# Patient Record
Sex: MALE | Race: WHITE | Employment: FULL TIME | URBAN - METROPOLITAN AREA
[De-identification: names, ages, dates, MRNs, and addresses within clinical notes are randomized per-mention and may not be internally consistent; named-entity substitution may affect disease eponyms.]

---

## 2024-03-17 ENCOUNTER — HOSPITAL ENCOUNTER (OUTPATIENT)
Age: 54
Discharge: HOME | End: 2024-03-17
Payer: COMMERCIAL

## 2024-03-17 VITALS
RESPIRATION RATE: 20 BRPM | SYSTOLIC BLOOD PRESSURE: 171 MMHG | TEMPERATURE: 98 F | DIASTOLIC BLOOD PRESSURE: 94 MMHG | HEART RATE: 99 BPM | OXYGEN SATURATION: 97 %

## 2024-03-17 DIAGNOSIS — J34.89 RHINORRHEA: Primary | ICD-10-CM

## 2024-03-17 DIAGNOSIS — J02.9 VIRAL PHARYNGITIS: ICD-10-CM

## 2024-03-17 LAB
POCT INFLUENZA A: NEGATIVE
POCT INFLUENZA B: NEGATIVE
S PYO AG THROAT QL: NEGATIVE
SARS-COV-2 RNA RESP QL NAA+PROBE: NOT DETECTED

## 2024-03-17 RX ORDER — OXYMETAZOLINE HYDROCHLORIDE 0.05 G/100ML
1 SPRAY NASAL 2 TIMES DAILY
Qty: 15 ML | Refills: 0 | Status: SHIPPED | OUTPATIENT
Start: 2024-03-17 | End: 2024-04-16

## 2024-03-17 RX ORDER — OXYMETAZOLINE HYDROCHLORIDE 0.05 G/100ML
1 SPRAY NASAL EVERY 4 HOURS PRN
Qty: 15 ML | Refills: 0 | Status: SHIPPED | OUTPATIENT
Start: 2024-03-17 | End: 2024-03-17

## 2024-03-17 RX ORDER — AMLODIPINE BESYLATE 10 MG/1
10 TABLET ORAL DAILY
COMMUNITY

## 2024-03-17 RX ORDER — BENZONATATE 100 MG/1
100 CAPSULE ORAL 3 TIMES DAILY PRN
Qty: 30 CAPSULE | Refills: 0 | Status: SHIPPED | OUTPATIENT
Start: 2024-03-17 | End: 2024-04-16

## 2024-03-17 RX ORDER — PSEUDOEPHEDRINE HCL 30 MG
30 TABLET ORAL EVERY 4 HOURS PRN
Qty: 36 TABLET | Refills: 0 | Status: SHIPPED | OUTPATIENT
Start: 2024-03-17 | End: 2024-04-16

## 2024-03-17 RX ORDER — ALBUTEROL SULFATE 90 UG/1
2 AEROSOL, METERED RESPIRATORY (INHALATION) EVERY 4 HOURS PRN
Qty: 1 EACH | Refills: 0 | Status: SHIPPED | OUTPATIENT
Start: 2024-03-17 | End: 2024-04-16

## 2024-03-17 NOTE — ED PROVIDER NOTES
Patient Seen in: [facility]      History     Chief Complaint   Patient presents with    Sore Throat     Entered by patient    Sore Throat    Sneezing    Runny Nose     Stated Complaint: Sore Throat    Subjective:   HPI  Jonathan Khoury is a 53 year old male  presents with throat pain x 2 days. Patient reports dysphagia to both solids and liquids, ear pain, rhinorrhea, fevers, chills. Patient denies shortness of breath, respiratory distress, stridor, neck pain/ stiffness, headache, eye pain/ redness, facial/ lip/ eyelid swelling. No medications taken prior to arrival. No alleviating/ aggravating factors. Pain 3/10      Objective:   Past Medical History:   Diagnosis Date    Allergic rhinitis     Seasonal    Essential hypertension               Past Surgical History:   Procedure Laterality Date    OTHER SURGICAL HISTORY  2007    Deviated septum repair                Social History     Socioeconomic History    Marital status:    Tobacco Use    Smoking status: Never    Smokeless tobacco: Never   Vaping Use    Vaping Use: Never used   Substance and Sexual Activity    Alcohol use: Yes     Comment: 1/month    Drug use: Never              Review of Systems   All other systems reviewed and are negative.      Positive for stated complaint: Sore Throat  Other systems are as noted in HPI.  Constitutional and vital signs reviewed.      All other systems reviewed and negative except as noted above.    Physical Exam     ED Triage Vitals [03/17/24 1211]   BP (!) 171/94   Pulse 99   Resp 20   Temp 98.4 °F (36.9 °C)   Temp src Temporal   SpO2 97 %   O2 Device None (Room air)       Current:BP (!) 171/94   Pulse 99   Temp 98.4 °F (36.9 °C) (Temporal)   Resp 20   SpO2 97%         Physical Exam  Vitals and nursing note reviewed.   Constitutional:       General: He is not in acute distress.     Appearance: Normal appearance. He is normal weight. He is not ill-appearing, toxic-appearing or diaphoretic.    HENT:      Head: Normocephalic and atraumatic.      Right Ear: Tympanic membrane, ear canal and external ear normal. There is no impacted cerumen.      Left Ear: Tympanic membrane, ear canal and external ear normal. There is no impacted cerumen.      Nose: Congestion present. No rhinorrhea.      Mouth/Throat:      Mouth: Mucous membranes are moist.      Pharynx: Oropharynx is clear. No oropharyngeal exudate or posterior oropharyngeal erythema.   Eyes:      General: No scleral icterus.        Right eye: No discharge.         Left eye: No discharge.      Extraocular Movements: Extraocular movements intact.      Conjunctiva/sclera: Conjunctivae normal.      Pupils: Pupils are equal, round, and reactive to light.   Cardiovascular:      Rate and Rhythm: Normal rate.      Pulses: Normal pulses.   Pulmonary:      Effort: Pulmonary effort is normal. No respiratory distress.      Breath sounds: Normal breath sounds. No stridor. No wheezing, rhonchi or rales.   Chest:      Chest wall: No tenderness.   Musculoskeletal:         General: No swelling, tenderness, deformity or signs of injury. Normal range of motion.      Cervical back: Normal range of motion and neck supple.      Right lower leg: No edema.      Left lower leg: No edema.   Skin:     General: Skin is warm and dry.      Capillary Refill: Capillary refill takes less than 2 seconds.      Coloration: Skin is not jaundiced or pale.      Findings: No bruising, erythema, lesion or rash.   Neurological:      General: No focal deficit present.      Mental Status: He is alert and oriented to person, place, and time. Mental status is at baseline.   Psychiatric:         Mood and Affect: Mood normal.         Behavior: Behavior normal.         Thought Content: Thought content normal.         Judgment: Judgment normal.               ED Course     Labs Reviewed   POCT RAPID STREP - Normal   RAPID SARS-COV-2 BY PCR - Normal   POCT FLU TEST - Normal    Narrative:     This assay is  a rapid molecular in vitro test utilizing nucleic acid amplification of influenza A and B viral RNA.          ED Course as of 03/17/24 1314  ------------------------------------------------------------  Time: 03/17 1233  Value: Rapid SARS-CoV-2 by PCR  Comment: Negative    ------------------------------------------------------------  Time: 03/17 1236  Value: POCT Flu Test  Comment: Negative flu A and B               MDM                                        Medical Decision Making  53 year old male presents with viral pharyngitis. Considerations to include but not limited to viral pharyngitis vs peritonsillar abscess vs bronchitis vs pneumonia vs COVID 19 vs influenza A vs influenza B.  Patient is overall well-appearing, normotensive, nontachycardic, not dyspneic with oxygen saturation at 97% on room air  Plan  - COVID 19/ Flu  A and B/ strep  swab  - SpO2 97% on room air  - reassess  - DC to home   - rx: albuterol inhaler 1-2 puffs by mouth every 4-6 hours/ prn.  Afrin 2 sprays to bilateral nostrils BID for no more than 48 consecutive hours to avoid rebound congestion.  Sudafed 30mg po q 6 hours.   Tessalon 100mg PO TID.    - refer to PCP for further evaluation    - return to ED        Amount and/or Complexity of Data Reviewed  Labs: ordered. Decision-making details documented in ED Course.     Details: COVID 19/ Flu  A and B/ strep  swab- negative         Disposition and Plan     Clinical Impression:  1. Rhinorrhea    2. Viral pharyngitis         Disposition:  Discharge  3/17/2024  1:08 pm    Follow-up:  Keo De La Cruz MD  [address] [place] [address] [phone]          [address] [address] [phone]              Medications Prescribed:  Current Discharge Medication List        START taking these medications    Details   benzonatate 100 MG Oral Cap Take 1 capsule (100 mg total) by mouth 3 (three) times daily as needed for cough.  Qty: 30  capsule, Refills: 0      pseudoephedrine 30 MG Oral Tab Take 1 tablet (30 mg total) by mouth every 4 (four) hours as needed for congestion.  Qty: 36 tablet, Refills: 0    Associated Diagnoses: Viral pharyngitis      !! albuterol 108 (90 Base) MCG/ACT Inhalation Aero Soln Inhale 2 puffs into the lungs every 4 (four) hours as needed for Wheezing.  Qty: 1 each, Refills: 0      oxymetazoline 0.05 % Nasal Solution 1 spray by Nasal route 2 (two) times daily.  Qty: 15 mL, Refills: 0       !! - Potential duplicate medications found. Please discuss with provider.

## 2024-03-17 NOTE — ED INITIAL ASSESSMENT (HPI)
Pt c/o sore throat, sneezing and runny nose. Pt states symptoms started on Friday. Pt states he had 2 negative home covid tests, pt worried the test may have been .

## 2024-04-19 ENCOUNTER — OFFICE VISIT (OUTPATIENT)
Dept: INTERNAL MEDICINE CLINIC | Facility: CLINIC | Age: 54
End: 2024-04-19
Payer: COMMERCIAL

## 2024-04-19 ENCOUNTER — LAB ENCOUNTER (OUTPATIENT)
Dept: LAB | Age: 54
End: 2024-04-19
Attending: STUDENT IN AN ORGANIZED HEALTH CARE EDUCATION/TRAINING PROGRAM
Payer: COMMERCIAL

## 2024-04-19 VITALS
HEART RATE: 80 BPM | DIASTOLIC BLOOD PRESSURE: 90 MMHG | RESPIRATION RATE: 20 BRPM | WEIGHT: 207 LBS | HEIGHT: 69 IN | SYSTOLIC BLOOD PRESSURE: 160 MMHG | TEMPERATURE: 98 F | OXYGEN SATURATION: 99 % | BODY MASS INDEX: 30.66 KG/M2

## 2024-04-19 DIAGNOSIS — Z11.4 SCREENING FOR HIV WITHOUT PRESENCE OF RISK FACTORS: ICD-10-CM

## 2024-04-19 DIAGNOSIS — Z00.00 PHYSICAL EXAM, ANNUAL: Primary | ICD-10-CM

## 2024-04-19 DIAGNOSIS — Z13.0 SCREENING FOR DEFICIENCY ANEMIA: ICD-10-CM

## 2024-04-19 DIAGNOSIS — Z13.228 SCREENING FOR ENDOCRINE, METABOLIC AND IMMUNITY DISORDER: ICD-10-CM

## 2024-04-19 DIAGNOSIS — Z13.1 SCREENING FOR DIABETES MELLITUS: ICD-10-CM

## 2024-04-19 DIAGNOSIS — Z13.220 SCREENING FOR LIPID DISORDERS: ICD-10-CM

## 2024-04-19 DIAGNOSIS — Z13.29 SCREENING FOR THYROID DISORDER: ICD-10-CM

## 2024-04-19 DIAGNOSIS — Z11.59 NEED FOR HEPATITIS C SCREENING TEST: ICD-10-CM

## 2024-04-19 DIAGNOSIS — Z11.59 NEED FOR HEPATITIS B SCREENING TEST: ICD-10-CM

## 2024-04-19 DIAGNOSIS — Z13.0 SCREENING FOR ENDOCRINE, METABOLIC AND IMMUNITY DISORDER: ICD-10-CM

## 2024-04-19 DIAGNOSIS — Z12.11 SCREENING FOR COLON CANCER: ICD-10-CM

## 2024-04-19 DIAGNOSIS — Z13.29 SCREENING FOR ENDOCRINE, METABOLIC AND IMMUNITY DISORDER: ICD-10-CM

## 2024-04-19 DIAGNOSIS — I10 PRIMARY HYPERTENSION: ICD-10-CM

## 2024-04-19 DIAGNOSIS — E55.9 VITAMIN D DEFICIENCY: ICD-10-CM

## 2024-04-19 DIAGNOSIS — L91.8 SKIN TAG: ICD-10-CM

## 2024-04-19 PROBLEM — R03.0 ELEVATED BLOOD PRESSURE READING: Status: ACTIVE | Noted: 2017-01-05

## 2024-04-19 PROBLEM — J01.10 ACUTE NON-RECURRENT FRONTAL SINUSITIS: Status: ACTIVE | Noted: 2017-01-05

## 2024-04-19 LAB
ALBUMIN SERPL-MCNC: 4.3 G/DL (ref 3.4–5)
ALBUMIN/GLOB SERPL: 1.1 {RATIO} (ref 1–2)
ALP LIVER SERPL-CCNC: 93 U/L
ALT SERPL-CCNC: 57 U/L
ANION GAP SERPL CALC-SCNC: 6 MMOL/L (ref 0–18)
AST SERPL-CCNC: 33 U/L (ref 15–37)
BASOPHILS # BLD AUTO: 0.05 X10(3) UL (ref 0–0.2)
BASOPHILS NFR BLD AUTO: 0.7 %
BILIRUB SERPL-MCNC: 0.6 MG/DL (ref 0.1–2)
BUN BLD-MCNC: 13 MG/DL (ref 9–23)
CALCIUM BLD-MCNC: 9 MG/DL (ref 8.5–10.1)
CHLORIDE SERPL-SCNC: 105 MMOL/L (ref 98–112)
CHOLEST SERPL-MCNC: 179 MG/DL (ref ?–200)
CO2 SERPL-SCNC: 30 MMOL/L (ref 21–32)
CREAT BLD-MCNC: 1.22 MG/DL
EGFRCR SERPLBLD CKD-EPI 2021: 71 ML/MIN/1.73M2 (ref 60–?)
EOSINOPHIL # BLD AUTO: 0.21 X10(3) UL (ref 0–0.7)
EOSINOPHIL NFR BLD AUTO: 2.9 %
ERYTHROCYTE [DISTWIDTH] IN BLOOD BY AUTOMATED COUNT: 12.5 %
EST. AVERAGE GLUCOSE BLD GHB EST-MCNC: 126 MG/DL (ref 68–126)
FASTING PATIENT LIPID ANSWER: YES
FASTING STATUS PATIENT QL REPORTED: YES
GLOBULIN PLAS-MCNC: 3.9 G/DL (ref 2.8–4.4)
GLUCOSE BLD-MCNC: 105 MG/DL (ref 70–99)
HBA1C MFR BLD: 6 % (ref ?–5.7)
HBV CORE AB SERPL QL IA: NONREACTIVE
HBV SURFACE AB SER QL: NONREACTIVE
HBV SURFACE AB SERPL IA-ACNC: <3.1 MIU/ML
HBV SURFACE AG SER-ACNC: <0.1 [IU]/L
HBV SURFACE AG SERPL QL IA: NONREACTIVE
HCT VFR BLD AUTO: 45.9 %
HCV AB SERPL QL IA: NONREACTIVE
HDLC SERPL-MCNC: 43 MG/DL (ref 40–59)
HGB BLD-MCNC: 15.9 G/DL
IMM GRANULOCYTES # BLD AUTO: 0.02 X10(3) UL (ref 0–1)
IMM GRANULOCYTES NFR BLD: 0.3 %
LDLC SERPL CALC-MCNC: 119 MG/DL (ref ?–100)
LYMPHOCYTES # BLD AUTO: 2.11 X10(3) UL (ref 1–4)
LYMPHOCYTES NFR BLD AUTO: 29.5 %
MCH RBC QN AUTO: 29.9 PG (ref 26–34)
MCHC RBC AUTO-ENTMCNC: 34.6 G/DL (ref 31–37)
MCV RBC AUTO: 86.4 FL
MONOCYTES # BLD AUTO: 0.6 X10(3) UL (ref 0.1–1)
MONOCYTES NFR BLD AUTO: 8.4 %
NEUTROPHILS # BLD AUTO: 4.17 X10 (3) UL (ref 1.5–7.7)
NEUTROPHILS # BLD AUTO: 4.17 X10(3) UL (ref 1.5–7.7)
NEUTROPHILS NFR BLD AUTO: 58.2 %
NONHDLC SERPL-MCNC: 136 MG/DL (ref ?–130)
OSMOLALITY SERPL CALC.SUM OF ELEC: 292 MOSM/KG (ref 275–295)
PLATELET # BLD AUTO: 347 10(3)UL (ref 150–450)
POTASSIUM SERPL-SCNC: 3 MMOL/L (ref 3.5–5.1)
PROT SERPL-MCNC: 8.2 G/DL (ref 6.4–8.2)
RBC # BLD AUTO: 5.31 X10(6)UL
SODIUM SERPL-SCNC: 141 MMOL/L (ref 136–145)
TRIGL SERPL-MCNC: 93 MG/DL (ref 30–149)
TSI SER-ACNC: 1.67 MIU/ML (ref 0.36–3.74)
VIT D+METAB SERPL-MCNC: 40.1 NG/ML (ref 30–100)
VLDLC SERPL CALC-MCNC: 16 MG/DL (ref 0–30)
WBC # BLD AUTO: 7.2 X10(3) UL (ref 4–11)

## 2024-04-19 PROCEDURE — 82306 VITAMIN D 25 HYDROXY: CPT

## 2024-04-19 PROCEDURE — 87340 HEPATITIS B SURFACE AG IA: CPT

## 2024-04-19 PROCEDURE — 86706 HEP B SURFACE ANTIBODY: CPT

## 2024-04-19 PROCEDURE — 84443 ASSAY THYROID STIM HORMONE: CPT

## 2024-04-19 PROCEDURE — 86704 HEP B CORE ANTIBODY TOTAL: CPT

## 2024-04-19 PROCEDURE — 86803 HEPATITIS C AB TEST: CPT

## 2024-04-19 PROCEDURE — 80053 COMPREHEN METABOLIC PANEL: CPT

## 2024-04-19 PROCEDURE — 80061 LIPID PANEL: CPT

## 2024-04-19 PROCEDURE — 85025 COMPLETE CBC W/AUTO DIFF WBC: CPT

## 2024-04-19 PROCEDURE — 83036 HEMOGLOBIN GLYCOSYLATED A1C: CPT

## 2024-04-19 PROCEDURE — 87389 HIV-1 AG W/HIV-1&-2 AB AG IA: CPT

## 2024-04-19 RX ORDER — AMLODIPINE BESYLATE 5 MG/1
10 TABLET ORAL DAILY
COMMUNITY
End: 2024-04-19

## 2024-04-19 RX ORDER — LOSARTAN POTASSIUM 25 MG/1
25 TABLET ORAL DAILY
Qty: 30 TABLET | Refills: 0 | Status: SHIPPED | OUTPATIENT
Start: 2024-04-19 | End: 2024-04-22

## 2024-04-19 RX ORDER — AMLODIPINE BESYLATE 5 MG/1
10 TABLET ORAL DAILY
Qty: 90 TABLET | Refills: 0 | Status: CANCELLED | OUTPATIENT
Start: 2024-04-19

## 2024-04-19 RX ORDER — AMLODIPINE BESYLATE 10 MG/1
10 TABLET ORAL DAILY
Qty: 90 TABLET | Refills: 0 | Status: SHIPPED | OUTPATIENT
Start: 2024-04-19

## 2024-04-19 NOTE — PROGRESS NOTES
[facility]    CHIEF COMPLAINT:   Chief Complaint   Patient presents with    [facility]     New patient     Routine Physical     Colon N/a Last Psa 6/4/21          HPI:   Jonathan Khoury is a 53 year old male who presents for a complete physical exam.      Patient Active Problem List   Diagnosis    Allergic rhinitis    HTN (hypertension)    Elevated blood pressure reading    Acute non-recurrent frontal sinusitis      Born and raised in [place],   [employer] 40 hour week Works from home   for 30, no children    Started on Amlodipine 10 mg daily in October 2023    Diet: Regular, can eat fast food 2 times a week.  Exercise:  Trying to get back to exercising. Has stationary bike at home. [place]  Eye exam: Goes to eye doctor for corneal abraision, much better now , has reading glasses , no contacts   Dentist: every 3-4  months for cleanings and checkups   Driving: yes, with the seatbelt  Sunscreen: yes, everyday throughout the year   Smoking: Never  Alcohol: On holidays  No other substance use.     Vaccinations:  Influenza: Out of season  COVID: Vaccinated x 5  Pneumococcal: Does not want It at this time  Shingles: Due, will provide information  Tdap/Td: 2019    Screenings:  Colonoscopy screen:    PSA: Due (will order next visit)    Diabetes screen? (age 35 to 70 who are overweight or obese): A1c ordered  Lipid panel? (age 20-35 with increased risk of CHD or older than 35): ordered    Wt Readings from Last 6 Encounters:   04/19/24 207 lb (93.9 kg)   06/04/21 212 lb 3.2 oz (96.3 kg)   12/20/19 205 lb (93 kg)     Body mass index is 30.57 kg/m².       Current Outpatient Medications   Medication Sig Dispense Refill    losartan 25 MG Oral Tab Take 1 tablet (25 mg total) by mouth daily. 90 tablet 0    amLODIPine 10 MG Oral Tab Take 1 tablet (10 mg total) by mouth daily. 90 tablet 0    Montelukast Sodium 10 MG Oral Tab Take 1 tablet (10 mg total) by mouth nightly. 90 tablet 3    Albuterol  Sulfate  (90 Base) MCG/ACT Inhalation Aero Soln Inhale 2 puffs into the lungs every 6 (six) hours as needed for Wheezing. 1 each 2      Past Medical History:    Allergic rhinitis    Seasonal    Essential hypertension      Past Surgical History:   Procedure Laterality Date    Other surgical history  2007    Deviated septum repair      Family History   Problem Relation Age of Onset    Hypertension Father     Cancer Father         Skin CA - wtih anti-rejection drugs    Other (Kidney replacement) Father     Hypertension Mother     Psychiatric Sister       Social History:   Social History     Socioeconomic History    Marital status:    Tobacco Use    Smoking status: Never     Passive exposure: Never    Smokeless tobacco: Never   Vaping Use    Vaping status: Never Used   Substance and Sexual Activity    Alcohol use: Yes     Comment: 1/month    Drug use: Never          REVIEW OF SYSTEMS:   GENERAL: feels well otherwise  SKIN: Positive for skin tags  EYES:denies blurred vision or double vision  HEENT: denies nasal congestion, sinus pain or ST  LUNGS: denies shortness of breath with exertion  CARDIOVASCULAR: denies chest pain on exertion  GI: denies abdominal pain,denies heartburn  : denies dysuria  MUSCULOSKELETAL: denies back pain  NEURO: denies headaches  PSYCHE: denies depression or anxiety  HEMATOLOGIC: denies hx of anemia  ENDOCRINE: denies thyroid history  ALL/ASTHMA: Positive  hx of asthma and pollen allergy  EXAM:   /90   Pulse 80   Temp 98.4 °F (36.9 °C)   Resp 20   Ht 5' 9\" (1.753 m)   Wt 207 lb (93.9 kg)   SpO2 99%   BMI 30.57 kg/m²   Body mass index is 30.57 kg/m².   GENERAL: well developed, well nourished,in no apparent distress  SKIN: no rashes,no suspicious lesions  HEENT: atraumatic, normocephalic,ears and throat are clear  EYES:PERRLA, conjunctiva are clear  NECK: supple,no adenopathy  CHEST: no chest tenderness  LUNGS: clear to auscultation  CARDIO: nl s1 and s2, RRR without  murmur  GI: good BS's,no masses, HSM or tenderness  MUSCULOSKELETAL: back is not tender,FROM of the back  EXTREMITIES: no cyanosis, clubbing or edema  NEURO: Oriented times three,cranial nerves are intact,motor and sensory are grossly intact  Labs:   No results found for: \"WBC\", \"HGB\", \"PLT\"   Lab Results   Component Value Date/Time     06/04/2021 08:37 AM     06/04/2021 08:37 AM    K 4.21 06/04/2021 08:37 AM     06/04/2021 08:37 AM    CO2 26.4 06/04/2021 08:37 AM    CREATSERUM 1.07 06/04/2021 08:37 AM    CA 9.4 06/04/2021 08:37 AM    ALB 4.8 06/04/2021 08:37 AM    TP 7.9 06/04/2021 08:37 AM    ALKPHO 87 06/04/2021 08:37 AM    AST 29 06/04/2021 08:37 AM    ALT 35 06/04/2021 08:37 AM    BILT 0.46 06/04/2021 08:37 AM        Lab Results   Component Value Date/Time    CHOLEST 176.00 06/04/2021 08:37 AM    HDL 39 (L) 06/04/2021 08:37 AM    TRIG 79.00 06/04/2021 08:37 AM     06/04/2021 08:37 AM              ASSESSMENT AND PLAN:   Jonathan Khoury is a 53 year old male who presents for a complete physical exam.     1. Physical exam, annual  Pt' s weight is Body mass index is 30.57 kg/m². Recommended regular exercise.   Age appropriate screenings discussed and orders placed.  The patient indicates understanding of these issues and agrees to the plan.    2. Screening for deficiency anemia  - CBC With Differential With Platelet; Future    3. Screening for endocrine, metabolic and immunity disorder  - Comp Metabolic Panel (14); Future    4. Screening for lipid disorders  - Lipid Panel; Future    5. Screening for diabetes mellitus  - Hemoglobin A1C; Future    6. Screening for thyroid disorder  - TSH W Reflex To Free T4; Future    7. Screening for colon cancer  - Gastro Referral - In Network    8. Primary hypertension  - amLODIPine 10 MG Oral Tab; Take 1 tablet (10 mg total) by mouth daily.  Dispense: 90 tablet; Refill: 0    9. Skin tag  - Derm Referral - In Network    10. Vitamin D deficiency  - Vitamin  D; Future    11. Screening for HIV without presence of risk factors  - HIV AG AB Combo [E]; Future    12. Need for hepatitis B screening test  - Hepatitis B Profile; Future    13. Need for hepatitis C screening test  - HCV Antibody [E]; Future       Return in about 1 year (around 4/19/2025) for physical exam or earlier depending on labs.      Sena Moreno MD

## 2024-05-17 ENCOUNTER — LAB ENCOUNTER (OUTPATIENT)
Dept: LAB | Age: 54
End: 2024-05-17
Attending: STUDENT IN AN ORGANIZED HEALTH CARE EDUCATION/TRAINING PROGRAM

## 2024-05-17 ENCOUNTER — OFFICE VISIT (OUTPATIENT)
Dept: INTERNAL MEDICINE CLINIC | Facility: CLINIC | Age: 54
End: 2024-05-17

## 2024-05-17 VITALS
SYSTOLIC BLOOD PRESSURE: 132 MMHG | HEIGHT: 69 IN | WEIGHT: 209.56 LBS | HEART RATE: 78 BPM | RESPIRATION RATE: 18 BRPM | DIASTOLIC BLOOD PRESSURE: 74 MMHG | BODY MASS INDEX: 31.04 KG/M2

## 2024-05-17 DIAGNOSIS — I10 PRIMARY HYPERTENSION: Primary | ICD-10-CM

## 2024-05-17 DIAGNOSIS — E87.6 HYPOKALEMIA: ICD-10-CM

## 2024-05-17 DIAGNOSIS — E78.2 MIXED HYPERLIPIDEMIA: ICD-10-CM

## 2024-05-17 PROBLEM — R03.0 ELEVATED BLOOD PRESSURE READING: Status: RESOLVED | Noted: 2017-01-05 | Resolved: 2024-05-17

## 2024-05-17 PROBLEM — J01.10 ACUTE NON-RECURRENT FRONTAL SINUSITIS: Status: RESOLVED | Noted: 2017-01-05 | Resolved: 2024-05-17

## 2024-05-17 LAB
ANION GAP SERPL CALC-SCNC: 5 MMOL/L (ref 0–18)
BUN BLD-MCNC: 10 MG/DL (ref 9–23)
CALCIUM BLD-MCNC: 9.1 MG/DL (ref 8.5–10.1)
CHLORIDE SERPL-SCNC: 105 MMOL/L (ref 98–112)
CO2 SERPL-SCNC: 30 MMOL/L (ref 21–32)
CREAT BLD-MCNC: 1.04 MG/DL
EGFRCR SERPLBLD CKD-EPI 2021: 86 ML/MIN/1.73M2 (ref 60–?)
FASTING STATUS PATIENT QL REPORTED: YES
GLUCOSE BLD-MCNC: 101 MG/DL (ref 70–99)
MAGNESIUM SERPL-MCNC: 2.6 MG/DL (ref 1.6–2.6)
OSMOLALITY SERPL CALC.SUM OF ELEC: 289 MOSM/KG (ref 275–295)
POTASSIUM SERPL-SCNC: 3.5 MMOL/L (ref 3.5–5.1)
SODIUM SERPL-SCNC: 140 MMOL/L (ref 136–145)

## 2024-05-17 PROCEDURE — 83735 ASSAY OF MAGNESIUM: CPT | Performed by: STUDENT IN AN ORGANIZED HEALTH CARE EDUCATION/TRAINING PROGRAM

## 2024-05-17 PROCEDURE — 3078F DIAST BP <80 MM HG: CPT | Performed by: STUDENT IN AN ORGANIZED HEALTH CARE EDUCATION/TRAINING PROGRAM

## 2024-05-17 PROCEDURE — 3075F SYST BP GE 130 - 139MM HG: CPT | Performed by: STUDENT IN AN ORGANIZED HEALTH CARE EDUCATION/TRAINING PROGRAM

## 2024-05-17 PROCEDURE — 99214 OFFICE O/P EST MOD 30 MIN: CPT | Performed by: STUDENT IN AN ORGANIZED HEALTH CARE EDUCATION/TRAINING PROGRAM

## 2024-05-17 PROCEDURE — 80048 BASIC METABOLIC PNL TOTAL CA: CPT | Performed by: STUDENT IN AN ORGANIZED HEALTH CARE EDUCATION/TRAINING PROGRAM

## 2024-05-17 PROCEDURE — 3008F BODY MASS INDEX DOCD: CPT | Performed by: STUDENT IN AN ORGANIZED HEALTH CARE EDUCATION/TRAINING PROGRAM

## 2024-05-17 NOTE — PROGRESS NOTES
OFFICE NOTE     Patient ID: Jonathan Khoury is a 53 year old male.  Today's Date: 05/17/24  Chief Complaint: Follow - Up (1 mo BP Systolic has been in high 110's to low 130's and diastolic has been 70's - 80's)    Patient is here to follow-up on his high blood pressure and his recent laboratory workup.    He has been measuring his blood pressures at home and brings in blood pressure readings with him today:      He has been taking Amlodipine 10 mg daily and Lisinopril 25 mg for the past 3 weeks. No side effects reported.     His HbA1c was in prediabetic range at 6.0%. Patient is currently trying to improve his diet and increase the exercising.     CMP showed low Potassium at 3.0 , Patient has been feeling well and increased his dietary potassium intake.     Lipid panel showed slightly elevated LDL at 119, otherwise normal.  The 10-year ASCVD risk score (Flo AREVALO, et al., 2019) is: 5.2%    Values used to calculate the score:      Age: 53 years      Sex: Male      Is Non- : No      Diabetic: No      Tobacco smoker: No      Systolic Blood Pressure: 132 mmHg      Is BP treated: Yes      HDL Cholesterol: 43 mg/dL      Total Cholesterol: 179 mg/dL    His other results , including Blood count, Liver, Kidney, Thyroid function are all normal.     Vitals:    05/17/24 0813   BP: 132/74   Pulse: 78   Resp: 18   Weight: 209 lb 9 oz (95.1 kg)   Height: 5' 9\" (1.753 m)     body mass index is 30.95 kg/m².  BP Readings from Last 3 Encounters:   05/17/24 132/74   04/19/24 160/90   03/17/24 (!) 171/94       Medications reviewed:  Current Outpatient Medications   Medication Sig Dispense Refill    Cholecalciferol (VITAMIN D3) 25 MCG (1000 UT) Oral Cap Take 1 tablet by mouth daily.      losartan 25 MG Oral Tab Take 1 tablet (25 mg total) by mouth daily. 90 tablet 0    amLODIPine 10 MG Oral Tab Take 1 tablet (10 mg total) by mouth daily. 90 tablet 0    Montelukast Sodium 10 MG Oral Tab Take 1 tablet  (10 mg total) by mouth nightly. 90 tablet 3    Albuterol Sulfate  (90 Base) MCG/ACT Inhalation Aero Soln Inhale 2 puffs into the lungs every 6 (six) hours as needed for Wheezing. 1 each 2         Assessment & Plan    1. Primary hypertension (Primary)  2. Mixed hyperlipidemia  HTN is under good control with current regiment of Amlodipine 10 mg and Lisinopril 25 mg daily.  ASCVD risk is 5.2 %. No family h/o of early CAD. Will do CT calcium score for further risk stratification and to decide if medication is needed.  -     CT CALCIUM SCORING; Future; Expected date: 05/17/2024    3. Hypokalemia  Will recheck Potassium levels. Will check Mag as well.  -     Basic Metabolic Panel (8); Future; Expected date: 05/17/2024  -     Magnesium; Future; Expected date: 05/17/2024    Follow Up: As needed/if symptoms worsen or Return in about 6 months (around 11/17/2024) for medication/BP check..         Objective/ Results:   Physical Exam  Constitutional:       General: He is not in acute distress.     Appearance: Normal appearance. He is not ill-appearing, toxic-appearing or diaphoretic.   HENT:      Head: Normocephalic.   Eyes:      Extraocular Movements: Extraocular movements intact.      Conjunctiva/sclera: Conjunctivae normal.      Pupils: Pupils are equal, round, and reactive to light.   Cardiovascular:      Rate and Rhythm: Regular rhythm.      Heart sounds: Normal heart sounds. No murmur heard.     No friction rub. No gallop.   Pulmonary:      Effort: No respiratory distress.      Breath sounds: Normal breath sounds. No stridor. No wheezing, rhonchi or rales.   Chest:      Chest wall: No tenderness.   Musculoskeletal:      Right lower leg: No edema.      Left lower leg: No edema.   Skin:     General: Skin is warm.      Capillary Refill: Capillary refill takes less than 2 seconds.   Neurological:      Mental Status: He is alert.      Cranial Nerves: No cranial nerve deficit.      Sensory: No sensory deficit.      Motor:  No weakness.   Psychiatric:         Mood and Affect: Mood normal.         Behavior: Behavior normal.         Thought Content: Thought content normal.         Judgment: Judgment normal.        Reviewed:    Patient Active Problem List    Diagnosis    Mixed hyperlipidemia    HTN (hypertension)    Allergic rhinitis     Seasonal        No Known Allergies     Social History     Socioeconomic History    Marital status:    Tobacco Use    Smoking status: Never     Passive exposure: Never    Smokeless tobacco: Never   Vaping Use    Vaping status: Never Used   Substance and Sexual Activity    Alcohol use: Yes     Comment: 1/month    Drug use: Never      Review of Systems   Constitutional: Negative.    HENT: Negative.     Eyes: Negative.    Respiratory: Negative.     Cardiovascular: Negative.    Gastrointestinal: Negative.    Endocrine: Negative.    Genitourinary: Negative.    Musculoskeletal: Negative.    Neurological: Negative.      All other systems negative unless otherwise stated in ROS or HPI above.       Sena Moreno MD  Internal Medicine       Call office with any questions or seek emergency care if necessary.   Patient understands and agrees to follow directions and advice.      ----------------------------------------- PATIENT INSTRUCTIONS-----------------------------------------     There are no Patient Instructions on file for this visit.      The 21st Century Cures Act makes medical notes available to patients in the interest of transparency.  However, please be advised that this is a medical document.  It is intended as a peer to peer communication.  It is written in medical language and may contain abbreviations or verbiage that are technical and unfamiliar.  It may appear blunt or direct.  Medical documents are intended to carry relevant information, facts as evident, and the clinical opinion of the practitioner.

## 2024-07-01 DIAGNOSIS — I10 PRIMARY HYPERTENSION: ICD-10-CM

## 2024-07-02 DIAGNOSIS — I10 PRIMARY HYPERTENSION: ICD-10-CM

## 2024-07-03 ENCOUNTER — MED REC SCAN ONLY (OUTPATIENT)
Dept: INTERNAL MEDICINE CLINIC | Facility: CLINIC | Age: 54
End: 2024-07-03

## 2024-07-03 RX ORDER — LOSARTAN POTASSIUM 25 MG/1
25 TABLET ORAL DAILY
Qty: 90 TABLET | Refills: 0 | OUTPATIENT
Start: 2024-07-03

## 2024-07-03 RX ORDER — LOSARTAN POTASSIUM 25 MG/1
25 TABLET ORAL DAILY
Qty: 90 TABLET | Refills: 1 | Status: SHIPPED | OUTPATIENT
Start: 2024-07-03

## 2024-07-03 NOTE — TELEPHONE ENCOUNTER
Hypertension Medications Protocol [id number]/02/2024 01:21 PM   Protocol Details CMP or BMP in past 12 months    Last BP reading less than 140/90    In person appointment or virtual visit in the past 12 mos or appointment in next 3 mos    EGFRCR or GFRNAA > 50   1. Primary hypertension (Primary)  2. Mixed hyperlipidemia  HTN is under good control with current regiment of Amlodipine 10 mg and Lisinopril 25 mg daily.  : As needed/if symptoms worsen or Return in about 6 months (around 11/17/2024) for medication/BP check..   No future appointments.

## 2024-07-17 DIAGNOSIS — I10 PRIMARY HYPERTENSION: ICD-10-CM

## 2024-07-17 RX ORDER — AMLODIPINE BESYLATE 10 MG/1
10 TABLET ORAL DAILY
Qty: 90 TABLET | Refills: 0 | Status: SHIPPED | OUTPATIENT
Start: 2024-07-17

## 2024-07-17 NOTE — TELEPHONE ENCOUNTER
Hypertension Medications Protocol [id number]/17/2024 04:01 PM   Protocol Details CMP or BMP in past 12 months    Last BP reading less than 140/90    In person appointment or virtual visit in the past 12 mos or appointment in next 3 mos    EGFRCR or GFRNAA > 50      No future appointments.

## 2024-07-20 ENCOUNTER — TELEPHONE (OUTPATIENT)
Dept: INTERNAL MEDICINE CLINIC | Facility: CLINIC | Age: 54
End: 2024-07-20

## 2024-07-20 NOTE — TELEPHONE ENCOUNTER
Incoming (mail or fax):  fax  Received from:  [facility]  Documentation given to:  triage

## 2024-08-21 ENCOUNTER — TELEPHONE (OUTPATIENT)
Dept: INTERNAL MEDICINE CLINIC | Facility: CLINIC | Age: 54
End: 2024-08-21

## 2024-10-07 ENCOUNTER — OFFICE VISIT (OUTPATIENT)
Dept: INTERNAL MEDICINE CLINIC | Facility: CLINIC | Age: 54
End: 2024-10-07
Payer: COMMERCIAL

## 2024-10-07 VITALS
HEIGHT: 69 IN | WEIGHT: 206 LBS | DIASTOLIC BLOOD PRESSURE: 80 MMHG | RESPIRATION RATE: 19 BRPM | SYSTOLIC BLOOD PRESSURE: 136 MMHG | TEMPERATURE: 98 F | BODY MASS INDEX: 30.51 KG/M2 | OXYGEN SATURATION: 98 % | HEART RATE: 72 BPM

## 2024-10-07 DIAGNOSIS — I10 PRIMARY HYPERTENSION: Primary | ICD-10-CM

## 2024-10-07 DIAGNOSIS — Z79.899 HIGH RISK MEDICATION USE: ICD-10-CM

## 2024-10-07 PROCEDURE — 3079F DIAST BP 80-89 MM HG: CPT | Performed by: STUDENT IN AN ORGANIZED HEALTH CARE EDUCATION/TRAINING PROGRAM

## 2024-10-07 PROCEDURE — 3075F SYST BP GE 130 - 139MM HG: CPT | Performed by: STUDENT IN AN ORGANIZED HEALTH CARE EDUCATION/TRAINING PROGRAM

## 2024-10-07 PROCEDURE — 99214 OFFICE O/P EST MOD 30 MIN: CPT | Performed by: STUDENT IN AN ORGANIZED HEALTH CARE EDUCATION/TRAINING PROGRAM

## 2024-10-07 PROCEDURE — G2211 COMPLEX E/M VISIT ADD ON: HCPCS | Performed by: STUDENT IN AN ORGANIZED HEALTH CARE EDUCATION/TRAINING PROGRAM

## 2024-10-07 PROCEDURE — 3008F BODY MASS INDEX DOCD: CPT | Performed by: STUDENT IN AN ORGANIZED HEALTH CARE EDUCATION/TRAINING PROGRAM

## 2024-10-07 RX ORDER — LOSARTAN POTASSIUM 25 MG/1
50 TABLET ORAL DAILY
COMMUNITY
Start: 2024-10-07

## 2024-10-07 RX ORDER — AMLODIPINE BESYLATE 5 MG/1
5 TABLET ORAL DAILY
Qty: 90 TABLET | Refills: 3 | Status: SHIPPED | OUTPATIENT
Start: 2024-10-07 | End: 2025-10-02

## 2024-10-07 NOTE — PROGRESS NOTES
OFFICE NOTE     Patient ID: Jonathan Khoury is a 53 year old male.  Today's Date: 10/07/24  Chief Complaint: Medication Follow-Up (Med check )    Patient is a 53-year-old male with PMH of HTN, HLD, prediabetes, who comes in today to follow-up on she is doing HTN  He has been taking Amlodipine 10 mg and Losartan 25 mg daily.  Has been trying to work on his diet, however was not able to exercise much due to removal of 2 suspicious moles by dermatology earlier in the summer.    Reports some mild lower extremity swelling ever since he started amlodipine.  States his blood pressure is well-controlled at home in the range of 130s/80s most of the time.  He checks it every 3 weeks now.      Vitals:    10/07/24 0742   BP: 136/80   Pulse: 72   Resp: 19   Temp: 97.9 °F (36.6 °C)   SpO2: 98%   Weight: 206 lb (93.4 kg)   Height: 5' 9\" (1.753 m)     body mass index is 30.42 kg/m².  BP Readings from Last 3 Encounters:   10/07/24 136/80   05/17/24 132/74   04/19/24 160/90     The 10-year ASCVD risk score (Flo AREVALO, et al., 2019) is: 5.5%    Values used to calculate the score:      Age: 53 years      Sex: Male      Is Non- : No      Diabetic: No      Tobacco smoker: No      Systolic Blood Pressure: 136 mmHg      Is BP treated: Yes      HDL Cholesterol: 43 mg/dL      Total Cholesterol: 179 mg/dL      Medications reviewed:  Current Outpatient Medications   Medication Sig Dispense Refill    amLODIPine 5 MG Oral Tab Take 1 tablet (5 mg total) by mouth daily. 90 tablet 3    losartan 25 MG Oral Tab Take 2 tablets (50 mg total) by mouth daily.      AMLODIPINE 10 MG Oral Tab TAKE 1 TABLET(10 MG) BY MOUTH DAILY 90 tablet 0    Montelukast Sodium 10 MG Oral Tab Take 1 tablet (10 mg total) by mouth nightly. 90 tablet 3    Albuterol Sulfate  (90 Base) MCG/ACT Inhalation Aero Soln Inhale 2 puffs into the lungs every 6 (six) hours as needed for Wheezing. 1 each 2    Cholecalciferol (VITAMIN D3) 25  MCG (1000 UT) Oral Cap Take 1 tablet by mouth daily.           Assessment & Plan    1. Primary hypertension (Primary)  BP well controlled. Reports good readings at home. Has mild LE swelling, which started after he started Amlodipine, we will try to reduce the dose of amlodipine and go up on Losartan today.  Needs to exercise more.   -     Increase Losartan from 25 mg daily  to 50 mg daily  -     Decrease Amlodipine from 10 mg daily to 5 mg daily  -     Will do BMP in 1 months to ensure kidney function is stable.  -     Patient should measure BP daily for the next 2 weeks and then send results in [facility],  -     amLODIPine Besylate; Take 1 tablet (5 mg total) by mouth daily.  Dispense: 90 tablet; Refill: 3  -     Basic Metabolic Panel (8); Future; Expected date: 11/07/2024      2. High risk medication use  Given increasing the dose of Losartan, will need to check kidney function in 1 months  -     Basic Metabolic Panel (8); Future; Expected date: 11/07/2024        Follow Up: As needed/if symptoms worsen or Return in about 6 months (around 4/7/2025) for physical exam..        There is a longitudinal care relationship with me, the care plan reflects the ongoing nature of the continuous relationship of care, and the medical record indicates that there is ongoing treatment of a serious/complex medical condition which I am currently managing.  is Applicable.     Objective/ Results:   Physical Exam  Constitutional:       General: He is not in acute distress.     Appearance: Normal appearance. He is not ill-appearing, toxic-appearing or diaphoretic.   HENT:      Head: Normocephalic.   Eyes:      Extraocular Movements: Extraocular movements intact.      Conjunctiva/sclera: Conjunctivae normal.      Pupils: Pupils are equal, round, and reactive to light.   Cardiovascular:      Rate and Rhythm: Regular rhythm.      Heart sounds: Normal heart sounds. No murmur heard.     No friction rub. No gallop.   Pulmonary:       Effort: No respiratory distress.      Breath sounds: Normal breath sounds. No stridor. No wheezing, rhonchi or rales.   Chest:      Chest wall: No tenderness.   Musculoskeletal:      Right lower leg: Edema (very mild) present.      Left lower leg: Edema (very mild) present.   Skin:     General: Skin is warm.      Capillary Refill: Capillary refill takes less than 2 seconds.   Neurological:      Mental Status: He is alert.      Cranial Nerves: No cranial nerve deficit.      Sensory: No sensory deficit.      Motor: No weakness.   Psychiatric:         Mood and Affect: Mood normal.         Behavior: Behavior normal.         Thought Content: Thought content normal.         Judgment: Judgment normal.        Reviewed:    Patient Active Problem List    Diagnosis    Mixed hyperlipidemia    HTN (hypertension)    Allergic rhinitis     Seasonal        No Known Allergies     Social History     Socioeconomic History    Marital status:    Tobacco Use    Smoking status: Never     Passive exposure: Never    Smokeless tobacco: Never   Vaping Use    Vaping status: Never Used   Substance and Sexual Activity    Alcohol use: Yes     Comment: 1/month    Drug use: Never      Review of Systems   Constitutional: Negative.    HENT: Negative.     Eyes: Negative.    Respiratory: Negative.     Cardiovascular: Negative.    Gastrointestinal: Negative.    Endocrine: Negative.    Genitourinary: Negative.    Musculoskeletal: Negative.    Neurological: Negative.      All other systems negative unless otherwise stated in ROS or HPI above.       Sena Moreno MD  Internal Medicine       Call office with any questions or seek emergency care if necessary.   Patient understands and agrees to follow directions and advice.      ----------------------------------------- PATIENT INSTRUCTIONS-----------------------------------------     There are no Patient Instructions on file for this visit.        The 21st Century Cures Act makes medical notes  available to patients in the interest of transparency.  However, please be advised that this is a medical document.  It is intended as a peer to peer communication.  It is written in medical language and may contain abbreviations or verbiage that are technical and unfamiliar.  It may appear blunt or direct.  Medical documents are intended to carry relevant information, facts as evident, and the clinical opinion of the practitioner.

## 2024-10-22 ENCOUNTER — PATIENT MESSAGE (OUTPATIENT)
Dept: INTERNAL MEDICINE CLINIC | Facility: CLINIC | Age: 54
End: 2024-10-22

## 2024-10-22 DIAGNOSIS — I10 PRIMARY HYPERTENSION: Primary | ICD-10-CM

## 2024-10-23 RX ORDER — LOSARTAN POTASSIUM 50 MG/1
50 TABLET ORAL DAILY
Qty: 90 TABLET | Refills: 0 | Status: SHIPPED | OUTPATIENT
Start: 2024-10-23

## 2024-11-07 ENCOUNTER — LAB ENCOUNTER (OUTPATIENT)
Dept: LAB | Age: 54
End: 2024-11-07
Attending: STUDENT IN AN ORGANIZED HEALTH CARE EDUCATION/TRAINING PROGRAM
Payer: COMMERCIAL

## 2024-11-07 DIAGNOSIS — Z79.899 HIGH RISK MEDICATION USE: ICD-10-CM

## 2024-11-07 DIAGNOSIS — I10 PRIMARY HYPERTENSION: ICD-10-CM

## 2024-11-07 LAB
ANION GAP SERPL CALC-SCNC: 4 MMOL/L (ref 0–18)
BUN BLD-MCNC: 11 MG/DL (ref 9–23)
CALCIUM BLD-MCNC: 10.2 MG/DL (ref 8.7–10.4)
CHLORIDE SERPL-SCNC: 102 MMOL/L (ref 98–112)
CO2 SERPL-SCNC: 33 MMOL/L (ref 21–32)
CREAT BLD-MCNC: 1.1 MG/DL
EGFRCR SERPLBLD CKD-EPI 2021: 80 ML/MIN/1.73M2 (ref 60–?)
FASTING STATUS PATIENT QL REPORTED: YES
GLUCOSE BLD-MCNC: 96 MG/DL (ref 70–99)
OSMOLALITY SERPL CALC.SUM OF ELEC: 287 MOSM/KG (ref 275–295)
POTASSIUM SERPL-SCNC: 3.6 MMOL/L (ref 3.5–5.1)
SODIUM SERPL-SCNC: 139 MMOL/L (ref 136–145)

## 2024-11-07 PROCEDURE — 80048 BASIC METABOLIC PNL TOTAL CA: CPT | Performed by: STUDENT IN AN ORGANIZED HEALTH CARE EDUCATION/TRAINING PROGRAM

## 2024-11-12 ENCOUNTER — ORDER TRANSCRIPTION (OUTPATIENT)
Dept: ADMINISTRATIVE | Facility: HOSPITAL | Age: 54
End: 2024-11-12

## 2024-11-12 DIAGNOSIS — Z71.3 ENCOUNTER FOR DIETARY CONSULTATION: ICD-10-CM

## 2024-11-12 DIAGNOSIS — I10 PRIMARY HYPERTENSION: Primary | ICD-10-CM

## 2024-11-12 DIAGNOSIS — E78.2 MIXED HYPERLIPIDEMIA: ICD-10-CM

## 2024-11-26 ENCOUNTER — OFFICE VISIT (OUTPATIENT)
Dept: NUTRITION | Facility: HOSPITAL | Age: 54
End: 2024-11-26
Attending: STUDENT IN AN ORGANIZED HEALTH CARE EDUCATION/TRAINING PROGRAM
Payer: COMMERCIAL

## 2024-11-26 PROBLEM — Z71.3 ENCOUNTER FOR DIETARY CONSULTATION: Status: ACTIVE | Noted: 2024-11-26

## 2024-11-26 PROCEDURE — 97802 MEDICAL NUTRITION INDIV IN: CPT

## 2024-11-26 NOTE — PROGRESS NOTES
ADULT INITIAL OUTPATIENT NUTRITION CONSULTATION    Nutrition Assessment    Medical Diagnosis: HTN, Pre-DM    PMH: hyperlipidemia    Client Hx: 54 year old male    Meds: noted    Labs : A1c: 6%, Glu: 96, LDL: 119, HDL: 43    ANTHROPOMETRICS:  Ht: 5'9\"  Wt: 206#    BMI: 30  AdjBW: 176#    Current Diet: Regular    Food/Beverage Intake:   BREAKFAST: fiber one cereal w granola (1/2c), plain yogurt, berries OR egg bites  LUNCH: [employer] frozen meals OR out  DINNER: frozen pizza, [employer] prepared meals, homemade chili or fish or chicken  SNACKS: cheese, salty snack, avoids sweets, nuts, fruit  BEVERAGES: coffee w half n half, water, no soda, juice ~8oz    Meal pattern: 3 meals/d, 1 snacks/d    Number of meals/week eaten at restaurants: 2x    Alcohol Intake: few drinks/year    Estimated nutrition needs: 1700 cals/d, 100 gm protein, <36 gm added sugars, <2000mg sodium    Physical Activity: bike in the morning 15 min, plans to add walk in evening  GOAL: 150+ minutes/week    Sleep: 7-8 hrs/d    Stress/anxiety: moderate-low  Coping mechanisms reviewed and encouraged    Nutrition Diagnosis: food and nutrition related knowledge deficit related to lack of previous diet education by RD as evidence by pt need for education regarding weight loss management, HTN, and pre-DM.    Nutrition Intervention/Education: Comprehensive nutrition education and evaluation provided for weight loss management, HTN, and pre-DM. Pt reports taking meds for blood pressure, logging at home. Pt aware of A1c 6%, reports his blood work is similar to his mothers (she has a steady A1c as well) Reviewed heart healthy, carbohydrate consistent diet guidelines. Pt watching labels for sodium, added sugars, and ingredient lists. Encouraged foods high in omega 3's, probiotics, and healthy fats. Suggestions for seasoning foods without salt provided and shopping for low sodium items. Discouraged added sugars, glycemic index, saturated fats and processed foods. Encouraged  lean meats, whole grains (fiber), low fat dairy, portion control, and more f&v. Written materials were issued and explained, encouraged meal delivery program to aid with meal planning. All questions answered today. Pt has set goals to follow recommendations set today, to track intake using phone phillip, MFP, and to contact RD with further questions or concerns. Suggest 10%  loss of body weight in 3-6 months.        Monitoring/Evaluation:  Pt to follow with MD for weight, labs, meds.   RD available prn.     Time spent with patient: 75 minutes    Thank you for the referral,    Mima Hampton RD, LDN  [email or web address]  P: [phone]

## 2024-12-17 ENCOUNTER — TELEPHONE (OUTPATIENT)
Dept: INTERNAL MEDICINE CLINIC | Facility: CLINIC | Age: 54
End: 2024-12-17

## 2025-01-17 DIAGNOSIS — I10 PRIMARY HYPERTENSION: ICD-10-CM

## 2025-01-17 RX ORDER — LOSARTAN POTASSIUM 50 MG/1
50 TABLET ORAL DAILY
Qty: 90 TABLET | Refills: 0 | Status: SHIPPED | OUTPATIENT
Start: 2025-01-17 | End: 2025-04-15

## 2025-01-17 NOTE — TELEPHONE ENCOUNTER
Hypertension Medications Protocol [id number]/17/2025 03:17 PM   Protocol Details CMP or BMP in past 12 months    Last BP reading less than 140/90    In person appointment or virtual visit in the past 12 mos or appointment in next 3 mos    EGFRCR or GFRNAA > 50    Medication is active on med list        \" Primary hypertension (Primary)  BP well controlled. Reports good readings at home. Has mild LE swelling, which started after he started Amlodipine, we will try to reduce the dose of amlodipine and go up on Losartan today.  Needs to exercise more.   -     Increase Losartan from 25 mg daily  to 50 mg daily  -     Decrease Amlodipine from 10 mg daily to 5 mg daily  -     Will do BMP in 1 months to ensure kidney function is stable.  -     Patient should measure BP daily for the next 2 weeks and then send results in [facility],  -     amLODIPine Besylate; Take 1 tablet (5 mg total) by mouth daily.  Dispense: 90 tablet; Refill: 3  -     Basic Metabolic Panel (8); Future; Expected date: 11/07/2024        2. High risk medication use  Given increasing the dose of Losartan, will need to check kidney function in 1 months  -     Basic Metabolic Panel (8); Future; Expected date: 11/07/2024           Follow Up: As needed/if symptoms worsen or Return in about 6 months (around 4/7/2025) for physical exam.. \"    No future appointments.     \"Nieves Castillo,      Your Blood Pressure readings are very good with current medications. I am very happy to know that the swelling is gone. Please continue your new regiment with Losartan 50 mg daily and Amlodipine 5 mg daily.      I will be waiting for your lab work results.     Best Regards,  Dr. Sena Moreno\"  \"  Sena Moreno MD  11/11/2024 10:56 AM CST Back to Top      Results reviewed. Released to "[employer].     Huy Castillo,  I have reviewed your test results.     Kidneys are doing well, which is great news. Please continue current management.     Please let me know if you have any questions.  Sena  MD Tiffanie   \"

## 2025-03-29 DIAGNOSIS — J30.2 SEASONAL ALLERGIC RHINITIS, UNSPECIFIED TRIGGER: ICD-10-CM

## 2025-03-31 RX ORDER — MONTELUKAST SODIUM 10 MG/1
10 TABLET ORAL NIGHTLY
Qty: 90 TABLET | Refills: 0 | Status: SHIPPED | OUTPATIENT
Start: 2025-03-31

## 2025-03-31 NOTE — TELEPHONE ENCOUNTER
Last office visit: 10/7/2024   Protocol: pass  Requested medication(s) are due for refill today: yes  Requested medication(s) are on the active medication list same strength, form, dose/ sig: yes  Requested medication(s) are managed by provider: yes  Patient has already received a courtsey refill: no    NOV: none   Asked to Return: 4/7/2025

## 2025-04-15 DIAGNOSIS — I10 PRIMARY HYPERTENSION: ICD-10-CM

## 2025-04-15 RX ORDER — LOSARTAN POTASSIUM 50 MG/1
50 TABLET ORAL DAILY
Qty: 90 TABLET | Refills: 0 | Status: SHIPPED | OUTPATIENT
Start: 2025-04-15 | End: 2025-06-23

## 2025-04-15 NOTE — TELEPHONE ENCOUNTER
Hypertension Medications Protocol [id number]/15/2025 02:37 PM   Protocol Details CMP or BMP in past 12 months    Last BP reading less than 140/90    In person appointment or virtual visit in the past 12 mos or appointment in next 3 mos    EGFRCR or GFRNAA > 50    Medication is active on med list      1. Primary hypertension (Primary)  BP well controlled. Reports good readings at home. Has mild LE swelling, which started after he started Amlodipine, we will try to reduce the dose of amlodipine and go up on Losartan today.  Needs to exercise more.   No future appointments.    Pt due for annual physical exam, please call to schedule thanks!

## 2025-06-23 ENCOUNTER — LAB ENCOUNTER (OUTPATIENT)
Dept: LAB | Age: 55
End: 2025-06-23
Attending: STUDENT IN AN ORGANIZED HEALTH CARE EDUCATION/TRAINING PROGRAM
Payer: COMMERCIAL

## 2025-06-23 ENCOUNTER — OFFICE VISIT (OUTPATIENT)
Dept: INTERNAL MEDICINE CLINIC | Facility: CLINIC | Age: 55
End: 2025-06-23
Payer: COMMERCIAL

## 2025-06-23 VITALS
WEIGHT: 199 LBS | HEART RATE: 70 BPM | DIASTOLIC BLOOD PRESSURE: 72 MMHG | TEMPERATURE: 98 F | BODY MASS INDEX: 29.47 KG/M2 | OXYGEN SATURATION: 98 % | SYSTOLIC BLOOD PRESSURE: 126 MMHG | HEIGHT: 69 IN

## 2025-06-23 DIAGNOSIS — Z12.5 SCREENING FOR PROSTATE CANCER: ICD-10-CM

## 2025-06-23 DIAGNOSIS — Z13.1 SCREENING FOR DIABETES MELLITUS: ICD-10-CM

## 2025-06-23 DIAGNOSIS — J35.8 TONSIL STONE: ICD-10-CM

## 2025-06-23 DIAGNOSIS — I10 PRIMARY HYPERTENSION: ICD-10-CM

## 2025-06-23 DIAGNOSIS — Z13.220 SCREENING FOR LIPID DISORDERS: ICD-10-CM

## 2025-06-23 DIAGNOSIS — Z12.11 SCREENING FOR COLON CANCER: ICD-10-CM

## 2025-06-23 DIAGNOSIS — Z13.228 SCREENING FOR ENDOCRINE, METABOLIC AND IMMUNITY DISORDER: ICD-10-CM

## 2025-06-23 DIAGNOSIS — Z13.0 SCREENING FOR ENDOCRINE, METABOLIC AND IMMUNITY DISORDER: ICD-10-CM

## 2025-06-23 DIAGNOSIS — E55.9 VITAMIN D DEFICIENCY: ICD-10-CM

## 2025-06-23 DIAGNOSIS — Z13.29 SCREENING FOR ENDOCRINE, METABOLIC AND IMMUNITY DISORDER: ICD-10-CM

## 2025-06-23 DIAGNOSIS — Z13.0 SCREENING FOR DEFICIENCY ANEMIA: ICD-10-CM

## 2025-06-23 DIAGNOSIS — Z00.00 PHYSICAL EXAM, ANNUAL: ICD-10-CM

## 2025-06-23 DIAGNOSIS — Z23 NEED FOR VACCINATION: ICD-10-CM

## 2025-06-23 DIAGNOSIS — J45.909 MILD ASTHMA WITHOUT COMPLICATION, UNSPECIFIED WHETHER PERSISTENT (HCC): ICD-10-CM

## 2025-06-23 DIAGNOSIS — J98.01 BRONCHOSPASM: ICD-10-CM

## 2025-06-23 DIAGNOSIS — Z00.00 PHYSICAL EXAM, ANNUAL: Primary | ICD-10-CM

## 2025-06-23 DIAGNOSIS — H04.123 DRY EYES: ICD-10-CM

## 2025-06-23 DIAGNOSIS — J30.2 SEASONAL ALLERGIC RHINITIS, UNSPECIFIED TRIGGER: ICD-10-CM

## 2025-06-23 DIAGNOSIS — Z13.29 SCREENING FOR THYROID DISORDER: ICD-10-CM

## 2025-06-23 LAB
ALBUMIN SERPL-MCNC: 5 G/DL (ref 3.2–4.8)
ALBUMIN/GLOB SERPL: 1.8 {RATIO} (ref 1–2)
ALP LIVER SERPL-CCNC: 80 U/L (ref 45–117)
ALT SERPL-CCNC: 28 U/L (ref 10–49)
ANION GAP SERPL CALC-SCNC: 8 MMOL/L (ref 0–18)
AST SERPL-CCNC: 25 U/L (ref ?–34)
BASOPHILS # BLD AUTO: 0.04 X10(3) UL (ref 0–0.2)
BASOPHILS NFR BLD AUTO: 0.5 %
BILIRUB SERPL-MCNC: 0.8 MG/DL (ref 0.3–1.2)
BUN BLD-MCNC: 13 MG/DL (ref 9–23)
CALCIUM BLD-MCNC: 9.7 MG/DL (ref 8.7–10.6)
CHLORIDE SERPL-SCNC: 102 MMOL/L (ref 98–112)
CHOLEST SERPL-MCNC: 173 MG/DL (ref ?–200)
CO2 SERPL-SCNC: 30 MMOL/L (ref 21–32)
COMPLEXED PSA SERPL-MCNC: 1 NG/ML (ref ?–4)
CREAT BLD-MCNC: 1.25 MG/DL (ref 0.7–1.3)
EGFRCR SERPLBLD CKD-EPI 2021: 69 ML/MIN/1.73M2 (ref 60–?)
EOSINOPHIL # BLD AUTO: 0.21 X10(3) UL (ref 0–0.7)
EOSINOPHIL NFR BLD AUTO: 2.6 %
ERYTHROCYTE [DISTWIDTH] IN BLOOD BY AUTOMATED COUNT: 12.3 %
EST. AVERAGE GLUCOSE BLD GHB EST-MCNC: 120 MG/DL (ref 68–126)
FASTING PATIENT LIPID ANSWER: YES
FASTING STATUS PATIENT QL REPORTED: YES
GLOBULIN PLAS-MCNC: 2.8 G/DL (ref 2–3.5)
GLUCOSE BLD-MCNC: 89 MG/DL (ref 70–99)
HBA1C MFR BLD: 5.8 % (ref ?–5.7)
HCT VFR BLD AUTO: 45.2 % (ref 39–53)
HDLC SERPL-MCNC: 40 MG/DL (ref 40–59)
HGB BLD-MCNC: 15.2 G/DL (ref 13–17.5)
IMM GRANULOCYTES # BLD AUTO: 0.02 X10(3) UL (ref 0–1)
IMM GRANULOCYTES NFR BLD: 0.2 %
LDLC SERPL CALC-MCNC: 111 MG/DL (ref ?–100)
LYMPHOCYTES # BLD AUTO: 2.52 X10(3) UL (ref 1–4)
LYMPHOCYTES NFR BLD AUTO: 31.5 %
MCH RBC QN AUTO: 29.7 PG (ref 26–34)
MCHC RBC AUTO-ENTMCNC: 33.6 G/DL (ref 31–37)
MCV RBC AUTO: 88.3 FL (ref 80–100)
MONOCYTES # BLD AUTO: 0.63 X10(3) UL (ref 0.1–1)
MONOCYTES NFR BLD AUTO: 7.9 %
NEUTROPHILS # BLD AUTO: 4.59 X10 (3) UL (ref 1.5–7.7)
NEUTROPHILS # BLD AUTO: 4.59 X10(3) UL (ref 1.5–7.7)
NEUTROPHILS NFR BLD AUTO: 57.3 %
NONHDLC SERPL-MCNC: 133 MG/DL (ref ?–130)
OSMOLALITY SERPL CALC.SUM OF ELEC: 290 MOSM/KG (ref 275–295)
PLATELET # BLD AUTO: 351 10(3)UL (ref 150–450)
POTASSIUM SERPL-SCNC: 3.8 MMOL/L (ref 3.5–5.1)
PROT SERPL-MCNC: 7.8 G/DL (ref 5.7–8.2)
RBC # BLD AUTO: 5.12 X10(6)UL (ref 4.3–5.7)
SODIUM SERPL-SCNC: 140 MMOL/L (ref 136–145)
TRIGL SERPL-MCNC: 121 MG/DL (ref 30–149)
TSI SER-ACNC: 2.28 UIU/ML (ref 0.55–4.78)
VIT D+METAB SERPL-MCNC: 44.4 NG/ML (ref 30–100)
VLDLC SERPL CALC-MCNC: 21 MG/DL (ref 0–30)
WBC # BLD AUTO: 8 X10(3) UL (ref 4–11)

## 2025-06-23 PROCEDURE — 82306 VITAMIN D 25 HYDROXY: CPT | Performed by: STUDENT IN AN ORGANIZED HEALTH CARE EDUCATION/TRAINING PROGRAM

## 2025-06-23 PROCEDURE — 83036 HEMOGLOBIN GLYCOSYLATED A1C: CPT | Performed by: STUDENT IN AN ORGANIZED HEALTH CARE EDUCATION/TRAINING PROGRAM

## 2025-06-23 PROCEDURE — 80061 LIPID PANEL: CPT | Performed by: STUDENT IN AN ORGANIZED HEALTH CARE EDUCATION/TRAINING PROGRAM

## 2025-06-23 PROCEDURE — 80050 GENERAL HEALTH PANEL: CPT | Performed by: STUDENT IN AN ORGANIZED HEALTH CARE EDUCATION/TRAINING PROGRAM

## 2025-06-23 PROCEDURE — G0103 PSA SCREENING: HCPCS | Performed by: STUDENT IN AN ORGANIZED HEALTH CARE EDUCATION/TRAINING PROGRAM

## 2025-06-23 RX ORDER — ALBUTEROL SULFATE 90 UG/1
2 INHALANT RESPIRATORY (INHALATION) EVERY 6 HOURS PRN
Qty: 1 EACH | Refills: 2 | Status: SHIPPED | OUTPATIENT
Start: 2025-06-23

## 2025-06-23 RX ORDER — AMLODIPINE BESYLATE 5 MG/1
5 TABLET ORAL DAILY
Qty: 90 TABLET | Refills: 3 | Status: SHIPPED | OUTPATIENT
Start: 2025-06-23 | End: 2026-06-18

## 2025-06-23 RX ORDER — MONTELUKAST SODIUM 10 MG/1
10 TABLET ORAL NIGHTLY
Qty: 90 TABLET | Refills: 3 | Status: SHIPPED | OUTPATIENT
Start: 2025-06-23

## 2025-06-23 RX ORDER — LOSARTAN POTASSIUM 50 MG/1
50 TABLET ORAL DAILY
Qty: 90 TABLET | Refills: 3 | Status: SHIPPED | OUTPATIENT
Start: 2025-06-23

## 2025-06-23 NOTE — PATIENT INSTRUCTIONS
VISIT SUMMARY:  Today, you came in for your annual physical exam. We discussed your current health status, including your well-controlled hypertension and asthma, as well as your dry eyes and the management of your tonsil stones. We also reviewed your general health maintenance needs, including vaccinations and screenings that are due.    YOUR PLAN:  -TONSIL STONES: Tonsil stones are small lumps of hardened material that form in the tonsils. You are interested in exploring treatment options, including potential laser resurfacing. We will refer you to an ENT specialist for further evaluation and discussion of treatment options.    -HYPERTENSION: Hypertension, or high blood pressure, is well-controlled with your current medications, losartan 50 mg and amlodipine 5 mg. Your blood pressure readings are typically between 120-130/70-80 mmHg. Continue taking your medications as prescribed and monitor your blood pressure monthly.    -ASTHMA: Asthma is a condition where your airways narrow and swell, making it difficult to breathe. Your asthma is well-controlled with montelukast, and you have not needed to use your albuterol inhaler in years. Continue taking montelukast and keep your albuterol inhaler as a precaution.    -DRY EYES: Dry eyes occur when your eyes do not produce enough tears or the right quality of tears. Your symptoms have significantly improved with the use of silicone plugs in your tear ducts. Continue with your annual follow-up visits with your ophthalmologist.    -GENERAL HEALTH MAINTENANCE: You are due for several important health maintenance steps, including the pneumonia and shingles vaccines, a colonoscopy, and a PSA screening. These vaccinations and screenings are important for preventing serious health issues. We will administer the pneumonia vaccine today, recommend you get the shingles vaccine series at a pharmacy, provide a referral for a colonoscopy, and perform a PSA screening. Additionally,  we encourage you to get an annual flu vaccination.    INSTRUCTIONS:  Please schedule a follow-up appointment with an ENT specialist to discuss treatment options for your tonsil stones. You also need to schedule a colonoscopy with a gastroenterologist. Ensure you continue with your annual ophthalmologist visits and regular dental visits every four months. Follow up on your dermatology check-ups annually.    Contains text generated by Alie

## 2025-06-23 NOTE — PROGRESS NOTES
[facility]    CHIEF COMPLAINT:   Chief Complaint   Patient presents with    Physical     Physical exam, prescription refill, pt is feeling ok.             The following individual(s) verbally consented to be recorded using ambient AI listening technology and understand that they can each withdraw their consent to this listening technology at any point by asking the clinician to turn off or pause the recording:    Patient name: Jonathan Khoury    HPI:   Jonathan Khoury is a 54 year old male who presents for a complete physical exam.      Problem List[1]     History of Present Illness  Mr. Jonathan Khoury is a 54 year old male who presents for an annual physical exam.    He has hypertension, managed with losartan 50 mg and amlodipine 5 mg. Blood pressure readings are typically between 120-130/70-80 mmHg. He experiences increased urination since starting antihypertensive medication. No chest pain, shortness of breath, or urination problems aside from increased frequency.    He has a history of dry eyes following corneal ablation and uses silicone plugs in his tear ducts, which have significantly improved symptoms. He uses reading glasses and plans to see an optometrist for new ones. He visits the ophthalmologist annually.    He has asthma, well-controlled with montelukast. He has not used his albuterol inhaler in years but keeps it as a precaution. No recent use of albuterol.    He inquires about treatment options for tonsil stones, which he manages by removing them himself but is interested in long-term solutions.    He is overdue for a colonoscopy and PSA test, having not scheduled them due to previous dermatological appointments. He is vaccinated against COVID-19 but did not receive the most recent booster. He typically gets flu shots but missed last year's. He has not received the pneumonia or shingles vaccines yet.    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 He has been  for 30 years and does not have children. His social activities are currently limited due to caring for a cat with liver failure. He reports a weight loss of 10 pounds since his last visit, attributed to reduced fast food consumption. He exercises regularly using a stationary bike at home, aiming for 10 to 20 minutes from Monday to Friday, and plans to play more golf as weather permits.     Born and raised in [place],   Telecom[employer]ications company 40 hour week Works from home   for 30, no children, does not plan.     Started on Amlodipine 10 mg daily in October 2023     Diet: Regular, less fast food  Exercise:  Trying to get back to exercising. Has stationary bike at home - does mon- fri 10-20 min. Planning to do more Golfing  Eye exam: Had corneal abraision, much better now - goes to eye doctor yearly, has reading glasses , no contacts. Now treated for dry eyes  Dentist: every 4 months for cleanings and checkups   Driving: yes, with the seatbelt  Sunscreen: yes, everyday throughout the year   Smoking: Never  Alcohol: On holidays  No other substance use.      Vaccinations:  Influenza: Out of season, typically gets them in October  COVID: Vaccinated x 5  Pneumococcal: Due. Will do in the office today 06/23/2025  Shingles: Due, will provide information  Tdap/Td: 12/20/2019     Screenings:  Colonoscopy screen:  Due, referral will be provided  PSA: Due -will order      Colonoscopy: -, Due, will provide the referral   Diabetes screening: Last A1c value was 6% done 4/19/2024.    Dexa:Not on file  Lipid screening: Cholesterol: 179, done on 4/19/2024.  HDL Cholesterol: 43, done on 4/19/2024.  TriGlycerides 93, done on 4/19/2024.  LDL Cholesterol: 119, done on 4/19/2024.       Wt Readings from Last 6 Encounters:   06/23/25 199 lb (90.3 kg)   10/07/24 206 lb (93.4 kg)   05/17/24 209 lb 9 oz (95.1 kg)   04/19/24 207 lb (93.9 kg)   06/04/21 212 lb 3.2 oz (96.3 kg)    12/20/19 205 lb (93 kg)     Body mass index is 29.39 kg/m².       Current Medications[2]   Past Medical History[3]   Past Surgical History[4]   Family History[5]   Social History:   Short Social Hx on File[6]       REVIEW OF SYSTEMS:   Review of Systems   Constitutional:  Negative for chills and fever.   HENT:  Negative for congestion, ear pain, hearing loss and sinus pain.    Eyes:  Negative for blurred vision, double vision and pain.   Respiratory:  Negative for cough, shortness of breath and wheezing.    Cardiovascular:  Negative for chest pain, palpitations and leg swelling.   Gastrointestinal:  Negative for abdominal pain, constipation, diarrhea, heartburn, nausea and vomiting.   Genitourinary:  Negative for frequency and urgency.   Musculoskeletal:  Negative for back pain and joint pain.   Skin:  Negative for rash.   Neurological:  Negative for dizziness and headaches.   Psychiatric/Behavioral: Negative.        EXAM:   /72 (BP Location: Left arm, Patient Position: Sitting, Cuff Size: child)   Pulse 70   Temp 97.7 °F (36.5 °C) (Temporal)   Ht 5' 9\" (1.753 m)   Wt 199 lb (90.3 kg)   SpO2 98%   BMI 29.39 kg/m²   Body mass index is 29.39 kg/m².   Physical Exam  Vitals reviewed.   Constitutional:       General: He is not in acute distress.     Appearance: Normal appearance. He is not ill-appearing or toxic-appearing.   HENT:      Head: Normocephalic and atraumatic.      Right Ear: Tympanic membrane, ear canal and external ear normal.      Left Ear: Tympanic membrane, ear canal and external ear normal.      Mouth/Throat:      Mouth: Mucous membranes are moist.      Pharynx: Oropharynx is clear. No oropharyngeal exudate or posterior oropharyngeal erythema.   Eyes:      Extraocular Movements: Extraocular movements intact.      Conjunctiva/sclera: Conjunctivae normal.      Pupils: Pupils are equal, round, and reactive to light.   Cardiovascular:      Rate and Rhythm: Normal rate and regular rhythm.       Pulses: Normal pulses.      Heart sounds: Normal heart sounds. No murmur heard.     No friction rub. No gallop.   Pulmonary:      Effort: Pulmonary effort is normal. No respiratory distress.      Breath sounds: Normal breath sounds. No stridor. No wheezing, rhonchi or rales.   Abdominal:      General: Abdomen is flat. There is no distension.      Palpations: There is no mass.      Tenderness: There is no abdominal tenderness. There is no right CVA tenderness, left CVA tenderness, guarding or rebound.      Hernia: No hernia is present.   Musculoskeletal:      Cervical back: Normal range of motion.      Right lower leg: No edema.      Left lower leg: No edema.   Lymphadenopathy:      Cervical: No cervical adenopathy.   Skin:     General: Skin is warm.      Capillary Refill: Capillary refill takes less than 2 seconds.      Coloration: Skin is not jaundiced or pale.      Findings: No bruising, erythema, lesion or rash.   Neurological:      General: No focal deficit present.      Mental Status: He is alert and oriented to person, place, and time.      Cranial Nerves: No cranial nerve deficit.      Sensory: No sensory deficit.      Motor: No weakness.   Psychiatric:         Mood and Affect: Mood normal.         Behavior: Behavior normal.         Thought Content: Thought content normal.         Judgment: Judgment normal.        Labs:   Lab Results   Component Value Date/Time    WBC 7.2 04/19/2024 09:19 AM    HGB 15.9 04/19/2024 09:19 AM    .0 04/19/2024 09:19 AM      Lab Results   Component Value Date/Time    GLU 96 11/07/2024 07:04 AM     11/07/2024 07:04 AM    K 3.6 11/07/2024 07:04 AM     11/07/2024 07:04 AM    CO2 33.0 (H) 11/07/2024 07:04 AM    CREATSERUM 1.10 11/07/2024 07:04 AM    CA 10.2 11/07/2024 07:04 AM    ALB 4.3 04/19/2024 09:19 AM    TP 8.2 04/19/2024 09:19 AM    ALKPHO 93 04/19/2024 09:19 AM    AST 33 04/19/2024 09:19 AM    ALT 57 04/19/2024 09:19 AM    BILT 0.6 04/19/2024 09:19 AM    TSH  1.670 04/19/2024 09:19 AM        Lab Results   Component Value Date/Time    CHOLEST 179 04/19/2024 09:19 AM    HDL 43 04/19/2024 09:19 AM    TRIG 93 04/19/2024 09:19 AM     (H) 04/19/2024 09:19 AM    NONHDLC 136 (H) 04/19/2024 09:19 AM       Lab Results   Component Value Date/Time    A1C 6.0 (H) 04/19/2024 09:19 AM      Vitamin D:    Lab Results   Component Value Date    VITD 40.1 04/19/2024           ASSESSMENT AND PLAN:   Jonathan Khoury is a 54 year old male who presents for a complete physical exam.   Pt' s weight is Body mass index is 29.39 kg/m². Recommended regular exercise.   Age appropriate screenings discussed and orders placed.  The patient indicates understanding of these issues and agrees to the plan.  Continued Daily sunscreen recommended  Annual eye exam and Q 6 month dental exam recommended    1. Physical exam, annual  - CBC With Differential With Platelet; Future  - Comp Metabolic Panel (14); Future  - Lipid Panel; Future  - Vitamin D; Future  - TSH W Reflex To Free T4; Future  - Hemoglobin A1C; Future    2. Need for vaccination  - Prevnar 20 (PCV20) [81219]    3. Screening for deficiency anemia  - CBC With Differential With Platelet; Future    4. Screening for endocrine, metabolic and immunity disorder  - Comp Metabolic Panel (14); Future    5. Screening for lipid disorders  - Lipid Panel; Future    6. Vitamin D deficiency  - Vitamin D; Future    7. Screening for thyroid disorder  - TSH W Reflex To Free T4; Future    8. Screening for diabetes mellitus  - Hemoglobin A1C; Future    9. Screening for colon cancer  - GASTRO - INTERNAL    10. Screening for prostate cancer  - PSA, Total (Screening) [E]; Future    11. Bronchospasm  - albuterol 108 (90 Base) MCG/ACT Inhalation Aero Soln; Inhale 2 puffs into the lungs every 6 (six) hours as needed for Wheezing.  Dispense: 1 each; Refill: 2    12. Primary hypertension  Hypertension is well-controlled with losartan 50 mg and amlodipine 5 mg. Blood  pressure readings are typically between 120-130/70-80 mmHg. The medication regimen has also reduced swelling in the lower extremities and face. He reports increased urination since starting the medication, which is expected.  - Continue losartan 50 mg daily  - Continue amlodipine 5 mg daily  - Monitor blood pressure monthly  - amLODIPine 5 MG Oral Tab; Take 1 tablet (5 mg total) by mouth daily.  Dispense: 90 tablet; Refill: 3  - losartan 50 MG Oral Tab; Take 1 tablet (50 mg total) by mouth daily.  Dispense: 90 tablet; Refill: 3    13. Seasonal allergic rhinitis, unspecified trigger  - montelukast 10 MG Oral Tab; Take 1 tablet (10 mg total) by mouth nightly.  Dispense: 90 tablet; Refill: 3    14. Tonsil stone  He experiences tonsil stones and is interested in exploring treatment options, including potential laser resurfacing.  - Refer to ENT for evaluation and discussion of treatment options  - ENT - INTERNAL     15. Asthma uncomplicated  Asthma is well-controlled with montelukast. He has not used albuterol inhaler in years but keeps it as a precaution.  - Continue montelukast  - Refill albuterol inhaler as a precaution    16. Dry Eyes  He has dry eyes following corneal ablation. Silicone plugs in the tear ducts have significantly improved symptoms.  - Continue annual follow-up with ophthalmologist    General Health Maintenance  He is due for pneumonia and shingles vaccines, a colonoscopy, and PSA screening. Discussed the importance of vaccinations and screenings for age-related health maintenance. The shingles vaccine is 95% effective in preventing shingles, which can cause severe complications such as blindness if it affects the eye.  - Administer pneumonia vaccine  - Recommend shingles vaccine series at pharmacy  - Provide referral for colonoscopy  - Perform PSA screening  - Encourage annual flu vaccination    Follow-up  He requires follow-up for health maintenance and specialist consultations. He is overdue for a  colonoscopy and needs to schedule an appointment with the gastroenterologist. Regular dental visits every four months are necessary due to excessive tartar buildup.  - Schedule follow-up appointment with ENT  - Schedule colonoscopy with gastroenterologist  - Ensure annual ophthalmologist visit  - Encourage regular dental visits every four months  - Follow up on dermatology check-ups annually     Return in about 1 year (around 6/23/2026) for Physical exam and med check or earlier ajck Moreno MD       [1]   Patient Active Problem List  Diagnosis    Allergic rhinitis    HTN (hypertension)    Mixed hyperlipidemia    Encounter for dietary consultation    Dry eyes    Tonsil stone    Mild asthma without complication (HCC)   [2]   Current Outpatient Medications   Medication Sig Dispense Refill    albuterol 108 (90 Base) MCG/ACT Inhalation Aero Soln Inhale 2 puffs into the lungs every 6 (six) hours as needed for Wheezing. 1 each 2    amLODIPine 5 MG Oral Tab Take 1 tablet (5 mg total) by mouth daily. 90 tablet 3    losartan 50 MG Oral Tab Take 1 tablet (50 mg total) by mouth daily. 90 tablet 3    montelukast 10 MG Oral Tab Take 1 tablet (10 mg total) by mouth nightly. 90 tablet 3   [3]   Past Medical History:   Allergic rhinitis    Seasonal    Essential hypertension   [4]   Past Surgical History:  Procedure Laterality Date    Other surgical history  2007    Deviated septum repair   [5]   Family History  Problem Relation Age of Onset    Hypertension Father     Cancer Father         Skin CA - wtih anti-rejection drugs    Other (Kidney replacement) Father     Hypertension Mother     Psychiatric Sister    [6]   Social History  Socioeconomic History    Marital status:    Tobacco Use    Smoking status: Never     Passive exposure: Never    Smokeless tobacco: Never   Vaping Use    Vaping status: Never Used   Substance and Sexual Activity    Alcohol use: Yes     Comment: 2 a year    Drug use: Never

## (undated) NOTE — LETTER
12/17/2024       Jonathan Khoury   [address]  [facility] [id number]      Dear Jonathan,    IF YOU ARE 50 YEARS OF AGE OR OLDER, COLORECTAL CANCER SCREENING CAN SAVE YOUR LIFE.    As your primary care doctor, I want to do everything I can to protect your health.  Colorectal cancer is the second leading cause of cancer-related deaths in the [place].  Polyps and colorectal cancer do not always cause symptoms.  That's why screening is so important.  Regular screening can find colorectal cancer early when it is most curable.  These tests can help prevent the development of colorectal cancer.  All adults 50 and older should have one of the following colon cancer screenings as recommended by the American Cancer Society:    Colonoscopy every ten years or as advised by your physician  iFOBT every year (The iFOBT is a home test to detect blood in the stool. The test is quick, easy and requires no dietary restrictions.)    Please contact my office today so together we can determine which colorectal cancer screening is best for you.  Or, if you have already had one of the above colon cancer screenings, please let me know the date, method of screening, physician and/or facility that performed the screening so I can update your medical record.      If you have a history of colon cancer, colon polyps, or an abnormal colon screening result, please follow the instructions you have previously received from myself or your specialists.    There are no excuses to ignore early detection!  This is one cancer you can prevent.  Regular exams and preventive screenings are among the most important things you can do.  Thank you for taking time to take care of yourself.        Dr. Sena Moreno MD   [phone]

## (undated) NOTE — LETTER
ASTHMA ACTION PLAN for Jonathan Khoury     : 10/15/1970     Date: 2024  Provider:  Sena Moreno MD  Phone for doctor or clinic: [facility], [place]  [address] [facility] [address]  [facility] [address] [phone]    ACT Score: 25      You can use the colors of a traffic light to help learn about your asthma medicines.      1. Green - Go! % of Personal Best Peak Flow Use controller medicine.   Breathing is good  No cough or wheeze  Can work and play Medicine How much to take When to take it    Montelukast Sodium 10 MG Oral Tab  Take 1 tablet (10 mg total) by mouth nightly       2. Yellow - Caution. 50-79% Personal Best Peak  Flow.  Use reliever medicine to keep an asthma attack from getting bad.   Cough  Wheezing  Tight Chest  Wake up at night Medicine How much to take When to take it    Albuterol Sulfate  (90 Base) MCG/ACT Inhalation Aero Soln Inhale 2 puffs into the lungs every 6 (six) hours as needed for Wheezing.        Additional instructions Contact your PCP If your symptoms do not improve 24-48 Hrs        3. Red - Stop! Danger!  <50% Personal Best Peak  Flow. Take these medications until  Get help from a doctor   Medicine not helping  Breathing is hard and fast  Nose opens wide  Can't walk  Ribs show  Can't talk well Medicine How much to take When to take it    Albuterol Sulfate  (90 Base) MCG/ACT Inhalation Aero Soln Inhale 1-2 puffs into the lungs every 10 minutes as needed for Wheezing. While making your way to the nearest ER!!! Do not drive yourself.     Additional Instructions If your symptoms do not improve and you cannot contact your doctor, go to the emergency room or call 911 immediately!     [x] Asthma Action Plan reviewed with patient (and caregiver if necessary) and a copy of the plan was given to the patient/caregiver.   [] Asthma Action Plan reviewed with patient (and caregiver if necessary) on the phone and mailed copy to patient  or submitted via [employer].     Signatures:  Provider  Sena Moreno MD   Patient Caretaker

## (undated) NOTE — LETTER
8/21/2024       Jonathan Yessenia Iraida   [address]  [place] [address]      Dear Jonathan,    IF YOU ARE 50 YEARS OF AGE OR OLDER, COLORECTAL CANCER SCREENING CAN SAVE YOUR LIFE.    As your primary care doctor, I want to do everything I can to protect your health.  Colorectal cancer is the second leading cause of cancer-related deaths in the [place].  Polyps and colorectal cancer do not always cause symptoms.  That's why screening is so important.  Regular screening can find colorectal cancer early when it is most curable.  These tests can help prevent the development of colorectal cancer.  All adults 50 and older should have one of the following colon cancer screenings as recommended by the American Cancer Society:    Colonoscopy every ten years or as advised by your physician  iFOBT every year (The iFOBT is a home test to detect blood in the stool. The test is quick, easy and requires no dietary restrictions.)    Please contact my office today so together we can determine which colorectal cancer screening is best for you.  Or, if you have already had one of the above colon cancer screenings, please let me know the date, method of screening, physician and/or facility that performed the screening so I can update your medical record.      If you have a history of colon cancer, colon polyps, or an abnormal colon screening result, please follow the instructions you have previously received from myself or your specialists.    There are no excuses to ignore early detection!  This is one cancer you can prevent.  Regular exams and preventive screenings are among the most important things you can do.  Thank you for taking time to take care of yourself.        Dr. Sena Moreno MD   [phone]